# Patient Record
Sex: FEMALE | Race: WHITE | NOT HISPANIC OR LATINO
[De-identification: names, ages, dates, MRNs, and addresses within clinical notes are randomized per-mention and may not be internally consistent; named-entity substitution may affect disease eponyms.]

---

## 2021-10-05 ENCOUNTER — APPOINTMENT (OUTPATIENT)
Dept: UROLOGY | Facility: CLINIC | Age: 81
End: 2021-10-05
Payer: MEDICARE

## 2021-10-05 ENCOUNTER — NON-APPOINTMENT (OUTPATIENT)
Age: 81
End: 2021-10-05

## 2021-10-05 VITALS
BODY MASS INDEX: 20.57 KG/M2 | DIASTOLIC BLOOD PRESSURE: 84 MMHG | HEART RATE: 97 BPM | WEIGHT: 128 LBS | SYSTOLIC BLOOD PRESSURE: 145 MMHG | TEMPERATURE: 96.2 F | HEIGHT: 66 IN

## 2021-10-05 PROBLEM — Z00.00 ENCOUNTER FOR PREVENTIVE HEALTH EXAMINATION: Status: ACTIVE | Noted: 2021-10-05

## 2021-10-05 PROCEDURE — 99204 OFFICE O/P NEW MOD 45 MIN: CPT

## 2021-10-05 RX ORDER — AMOXICILLIN AND CLAVULANATE POTASSIUM 500; 125 MG/1; MG/1
500-125 TABLET, FILM COATED ORAL
Qty: 14 | Refills: 0 | Status: ACTIVE | COMMUNITY
Start: 2021-10-05 | End: 1900-01-01

## 2021-10-06 ENCOUNTER — TRANSCRIPTION ENCOUNTER (OUTPATIENT)
Age: 81
End: 2021-10-06

## 2021-10-07 LAB
C TRACH RRNA SPEC QL NAA+PROBE: NOT DETECTED
HSV 1+2 IGG SER IA-IMP: NEGATIVE
HSV 1+2 IGG SER IA-IMP: POSITIVE
HSV1 IGG SER QL: 0.32 INDEX
HSV2 IGG SER QL: 6.64 INDEX
N GONORRHOEA RRNA SPEC QL NAA+PROBE: NOT DETECTED
SOURCE AMPLIFICATION: NORMAL

## 2021-10-08 LAB — RPR SER-TITR: ABNORMAL

## 2021-10-11 LAB
HSV1 IGM SER QL: NEGATIVE
HSV2 AB FLD-ACNC: POSITIVE

## 2021-10-11 NOTE — ASSESSMENT
[FreeTextEntry1] : \par =======================================================================================\par ASSESSMENT and PLAN\par \par The patient is a 80 year female with a history of the following:\par \par 1. Genital sores:\par She has genital sores that are painful. They may represent folliculitis or an STD. they do not appear to be an allergy.\par PLan:\par RPR, HSV Abs, GC/CT\par Augmentin\par \par The patient understands that Augmentin may cause nausea, vomiting, diarrhea, rash, headache and dizziness. She should stop taking it and contact me if she develops a severe rash, difficulty breathing or diarrhea.\par \par As with most antibiotics, they can interfere with the strength of some blood thinners (like Coumadin) and diabetes meds. The patient understands that this can cause blood to be too thick or thin and/or blood sugars to go too high or too low. The patient understands that if she  is on blood thinners she  needs to monitor for signs of bleeding. The patient understands that if she  is diabetic she  will have to more closely monitor her  blood sugars.\par \par \par 2. Urethral prolapse:\par she is already on Premarin and I asked her to apply daily to her urethra for two weeks. I will hold off on her SITZ baths due to her genital sores.\par \par -----------------------------------------------------------------------------------------------------\par LABS/TESTS Ordered: HSV Abs, RPR, GC/CT\par Meds Ordered: Augmentin one pill BID x7d, Premarin cream to urethra every day for two weeks, then twice weekly\par Follow up: 3 weeks\par -----------------------------------------------------------------------------------------------------\par \par Greater than 50% of this 45 minute visit was spent counseling the patient and coordinating care.\par \par Thank you for allowing me to assist in the care of your patient. Should you have any questions please do not hesitate to reach out to me.\par \par \par Mary Haywood MD\par Associate \par Department of Urology\par [facility]\par Phone: [phone]\par Fax: [phone]\par \par [address]\[facility] [address]\par

## 2021-10-11 NOTE — HISTORY OF PRESENT ILLNESS
[FreeTextEntry1] : Language: English\par Date of First visit: 10/5/2021\par Accompanied by: Self\par Contact info: [phone]\par Referring Provider/PCP: Dr. Kennedy Kelly\par ([facility])\par Fax: [phone]\par \par Gyn: Sara Porter\par Fax [phone]\par \par \par \par CC/ Problem List:\par \par ===============================================================================\par FIRST VISIT:\par The patient is a 80 year female who first presents 10/05/2021 for genital pain. she thought she had an infection but her doctor told her she had a polyp on her urethra. She had a urine test and there was no infection.\par All of her labia and painful and swollen. She states it is very sore but she is not sure if she has sores. Her Gynecologist told her to get hydrocortisone but sent her here for  her urethra. The cream did not help her. She feels the flesh in her genitals hurts when she is peeing and walking and most times.\par \par She denies changes to her soaps, detergents, diet, activity. She is sexually active with her boyfriend. \par she has no h/o gynecologic cancers. She had a shingles vaccine. She thinks this started after having intercourse (a few days later). She has never had cold sores.  She does not use protection during intercourse.\par \par -------------------------------------------------------------------------------------------\par INTERVAL VISITS:\par \par \par ===============================================================================\par \par PMH: Stroke \par PSH: Denies\par POBH: (if applicable) , LMP age 54\par FH: Denies\par \par ALL: NKDA\par MEDS: Aggrenox, Pravastatin, Amlodipine, Lunesta, Premarin cream\par SOC: Denies Tob, Social EtOH, No drugs\par \par \par ROS: Review of Systems is as per HPI unless otherwise denoted below\par \par \par ===============================================================================\par DATA: \par \par LABS:-------------------------------------------------------------------------------------------------------------------\par \par \par \par RADS:-------------------------------------------------------------------------------------------------------------------\par \par \par \par PATHOLOGY/CYTOLOGY:-------------------------------------------------------------------------------------------\par \par \par \par VOIDING STUDIES: ----------------------------------------------------------------------------------------------------\par \par \par \par STONE STUDIES: (Analysis/LLSA)----------------------------------------------------------------------------------\par \par \par \par PROCEDURES: -----------------------------------------------------------------------------------------------\par \par \par \par \par ===============================================================================\par \par PHYSICAL EXAM:\par \par GEN: AAOx3, NAD, Habitus: normal\par \par BARRIERS to CARE: none\par \par PSYCH: Appropriate Behavior, Affect Congruent\par \par HEENT: AT/NC Trachea midline. EOMI.\par \par Lungs: No labored breathing.\par \par NEURO: + Movement, all 4 extremities grossly intact without deficits. No tremors.\par \par SKIN: Warm dry. No visible rashes or ulcers\par \par GAIT: Gait mildly antalgic, Stability fair\par \par \par  FOCUSED: -----------------------------------------------------------------------------------------------\par \par Bladder: Nondistended, Nontender, No masses\par \par Ext Genitalia: Atrophic with two small flat red areas on her upper ext labia around hair follicle ? folliculitis or ingrown hair ? no purulence, fluctuance, crepitus, exudate; no vesicles\par \par Urethra: Normal, no masses, no scarring, + mucosal prolapse; No hypermobility\par \par No inguinal LAD or tenderness. No inguinal masses or ulcers. \par \par =======================================================================================\par

## 2021-10-11 NOTE — ADDENDUM
[FreeTextEntry1] : 10/5/2021: GC/CT negative, HSV 2 IgG positive, HSV 1 IgG negative, HSV2 IgM positive, HSV 1 IgM negative\par 10/5/2021: RPR 1:1 which is borderline. \par \par CAlled pt and she states she has a "fever" of 99 and HR of 92bpm. recommended that she speak to her PCP but she states her PCP won't call her back.\par I explained that her HSV 2 abx are positive but this does not tell me where in her body it is: it only tells me she was exposed to it. I recommended a culture (we have ordered swabs and they will arrive tomorrow or Monday), with the caveat that viral shedding is more robust in the start of an infection and then will often taper off so she may test negative.\par She seemed a bit confused by this information (as many are). She at first refused to come in and see me but then agreed. She will not come in until Tuesday because she is busy and can't get to the city.\par \par 10/8/2021 Asked Laly if she can find out how to order FTA-ABS since it is not in the orders

## 2021-10-12 ENCOUNTER — APPOINTMENT (OUTPATIENT)
Dept: UROLOGY | Facility: CLINIC | Age: 81
End: 2021-10-12
Payer: MEDICARE

## 2021-10-12 VITALS
DIASTOLIC BLOOD PRESSURE: 77 MMHG | SYSTOLIC BLOOD PRESSURE: 157 MMHG | TEMPERATURE: 97.2 F | HEART RATE: 76 BPM | OXYGEN SATURATION: 97 %

## 2021-10-12 PROCEDURE — 99214 OFFICE O/P EST MOD 30 MIN: CPT

## 2021-10-13 LAB — T PALLIDUM AB SER QL IA: NEGATIVE

## 2021-10-18 NOTE — HISTORY OF PRESENT ILLNESS
[FreeTextEntry1] : Language: English\par Date of First visit: 10/5/2021\par Accompanied by: Self\par Contact info: [phone]\par Referring Provider/PCP: Dr. Kennedy Kelly\par ([facility])\par Fax: [phone]\par \par Gyn: Sara Porter\par Fax [phone]\par \par \par \par CC/ Problem List:\par Urethral mucosal prolapse\par HSV-2, acute first infection\par \par ===============================================================================\par FIRST VISIT:\par The patient is a 80 year female who first presented on 10/05/2021 for genital pain. She thought she had an infection but her doctor told her she had a polyp on her urethra. She had a urine test and there was no infection.\par She reported that her labia were painful and swollen. She states it is very sore but she is not sure if she has sores. Her Gynecologist told her to get hydrocortisone but sent her here for  her urethra. The cream did not help her. She feels the flesh in her genitals hurts when she is peeing and walking and most times. She has a sensation of blockage when she voids and had some constipation.\par \par She denies changes to her soaps, detergents, diet, activity. She is sexually active with her boyfriend and she does not use protection.\par she has no h/o gynecologic cancers. She had a shingles vaccine. She thinks this started after having intercourse (a few days later). She has never had cold sores.\par \par -------------------------------------------------------------------------------------------\par INTERVAL VISITS:\par At her first visit on 10/5/2021 she had genital ulcers on her labia. We did not have viral swabs for culture. She felt feverish later but did not have inguinal LAD. Her HSV abs returned positive for HSV -2 IgG and IgM indicating and acute first infection.\par \par Today 10/12/2021 she states the burning has subsided quite a bit. She still feels mild urinary "blockage". She is using the premarin on her urethral mucosal prolapse.\par \par ===============================================================================\par \par PMH: Stroke \par PSH: Denies\par POBH: (if applicable) , LMP age 54\par FH: Denies\par \par ALL: NKDA\par MEDS: Aggrenox, Pravastatin, Amlodipine, Lunesta, Premarin cream\par SOC: Denies Tob, Social EtOH, No drugs\par \par \par ROS: Review of Systems is as per HPI unless otherwise denoted below\par \par \par ===============================================================================\par DATA: \par \par LABS:-------------------------------------------------------------------------------------------------------------------\par 10/5/2021: GC/CT negative, HSV 2 IgG positive, HSV 1 IgG negative, HSV2 IgM positive, HSV 1 IgM negative\par 10/5/2021: RPR 1:1 which is borderline. \par \par \par \par RADS:-------------------------------------------------------------------------------------------------------------------\par \par \par \par PATHOLOGY/CYTOLOGY:-------------------------------------------------------------------------------------------\par \par \par \par VOIDING STUDIES: ----------------------------------------------------------------------------------------------------\par \par \par \par STONE STUDIES: (Analysis/LLSA)----------------------------------------------------------------------------------\par \par \par \par PROCEDURES: -----------------------------------------------------------------------------------------------\par \par \par \par \par ===============================================================================\par \par PHYSICAL EXAM:\par \par GEN: AAOx3, NAD, Habitus: normal\par \par BARRIERS to CARE: none\par \par PSYCH: Appropriate Behavior, Affect Congruent\par \par HEENT: AT/NC Trachea midline. EOMI.\par \par Lungs: No labored breathing.\par \par NEURO: + Movement, all 4 extremities grossly intact without deficits. No tremors.\par \par SKIN: Warm dry. No visible rashes or ulcers\par \par GAIT: Gait mildly antalgic, Stability fair\par \par \par  FOCUSED: -----------------------------------------------------------------------------------------------\par \par Bladder: Nondistended, Nontender, No masses\par \par Ext Genitalia: lesions on labia are gone. \par \par Urethra: Normal, no masses, no scarring, + mucosal prolapse; No hypermobility\par \par No inguinal LAD or tenderness. No inguinal masses or ulcers. \par \par =======================================================================================\par

## 2021-10-18 NOTE — ASSESSMENT
[FreeTextEntry1] : \par =======================================================================================\par ASSESSMENT and PLAN\par \par The patient is a 80 year female with a history of the following:\par \par 1. Genital sores:\par She has genital sores that are painful.\par Her HSV-2 abs IgG and IgM came back positive and given her total clinical picture I suspect she had a primary genital infection of HSV-2. Her lesions are largely gone, but I sent cultures (now that we have culture swabs) which I suspect will be negative. \par \par I told the patient that there are two types of HSV (types 1, 2). I explained that HSV can be contracted on multiple parts of the body including the genitals, the lips, the finger (vinay), and the eye. Contrary to common believe, Type 1 and type 2 do not "belong" to any one part of the body and now at least 20% of cases of genital HSV are type 1 and vice versa. I explained that while the first outbreak of HSV is often the worst, this is not always true and up to 40% of people with HSV do not know that they have it. Additionally it is very hard to tell when a person contracts an infection based on symptoms or lesions. I explained that the only way to tell if someone was recently infected is to check an IgM antibody but even this has error.  \par \par PREVENTION: I explained that safe sex is certainly indicated during times of outbreaks and immediately prior (when patients often get a prodrome of tingling or itching). However I also explained the phenomena of asymptomatic shedding which renders a person "contagious" even when they do not have symptoms. As such honesty and protection are the best policy.  \par \par CROSS REACTIVITY: Although having an HSV infection is somewhat protective against a second infection with another strain, it is not entirely protective. Additionally I stressed the importance of good hand hygiene to prevent transmission to the fingers or eyes.  \par \par PROPHYLAXIS: I explained that we can Rx prophylaxis if recurrences become too bothersome or frequent. It can also reduce asymptomatic shedding. Suppression dosing is Valtrex 1000mg po QD, or 500mg po QD if <10 outbreaks/year); Acyclovir 400mg po BID \par \par DIAGNOSIS: I also explained that without culture of the lesion, blood serology can not tell me the location of the lesion. Recent infections also may not have seroconverted yet.  \par \par Based on our conversation the patient decided to forgo blood tests and to discuss his options with his partner more. He seems to have good insight and I encouraged him to bring his partner in for further discussion. He will email me if he gets another lesion and wants it cultured. \par \par TREATMENT:  \par \par Treatment of an initial infection is Valtrex 1000mg po Q12H x7-10d within 48-72 hours of onset; Acyclovir 400mg po TID x7-10d \par \par Treatment of recurrence is Valtrex 500mg po Q12H x3 days OR 1000mg po QD x5 days start within 24 hours; Acyclovir 400mg po TID x5 days or 800mg po BID x5 days\par \par She wants to bring her boyfriend in to have a discussion about HSV. We will treat or prophylax her based on recurrences.\par \par 2. Urethral prolapse:\par she is already on Premarin and I asked her to apply daily to her urethra for two weeks then BIW\par \par 3. Borderline RPR\par I tried to order her an FTA-ABS\par \par -----------------------------------------------------------------------------------------------------\par LABS/TESTS  FTA-ABS\par Meds Ordered: Premarin continue\par Follow up: with boyfriend for a 30 min visit\par -----------------------------------------------------------------------------------------------------\par \par Greater than 50% of this 30 minute visit was spent counseling the patient and coordinating care.\par \par Thank you for allowing me to assist in the care of your patient. Should you have any questions please do not hesitate to reach out to me.\par \par \par Mary Haywood MD\par Associate \[facility] Department of Urology\[facility] Phone: [phone]\[facility] Fax: [phone]\[facility] \[facility] [address]\[facility] [address]\[facility]

## 2021-10-18 NOTE — ADDENDUM
[FreeTextEntry1] : 10/12/2021: Treponema Ab negative\par 10/12/2021: Viral culture for herpes taken from labia + for HSV

## 2021-10-19 ENCOUNTER — APPOINTMENT (OUTPATIENT)
Dept: UROLOGY | Facility: CLINIC | Age: 81
End: 2021-10-19
Payer: MEDICARE

## 2021-10-19 PROCEDURE — 99214 OFFICE O/P EST MOD 30 MIN: CPT

## 2021-11-15 LAB
HHV SPEC CULT: ABNORMAL
HSV TYPE 1: NORMAL
HSV TYPE 2: ABNORMAL

## 2021-11-19 NOTE — ADDENDUM
[FreeTextEntry1] : Called pt back today 11/19/2021 because she likely has another outbreak.\par She will take meds but was worried about thrombosis. \par I told her she can take it.

## 2021-11-19 NOTE — ASSESSMENT
[FreeTextEntry1] : \par =======================================================================================\par ASSESSMENT and PLAN\par \par Today 10/19/2021 the patient is a 80 year old female with a history of the following:\par \par 1. Genital HSV-2\par She has genital sores that are painful.\par Her HSV-2 abs IgG and IgM came back positive and her genital culture also returned HSV-2 positive. \par \par She returns with her boyfriend to discuss their clinical situation. HE has never had symptoms of HSV but given that they are monogamous and this was her first infection, he acknowledges that she likely got it from him.\par \par I told the patient that there are two types of HSV (types 1, 2). I explained that HSV can be contracted on multiple parts of the body including the genitals, the lips, the finger (vinay), and the eye. Contrary to common belief, Type 1 and type 2 do not "belong" to any one part of the body and now at least 20% of cases of genital HSV are type 1 and vice versa. I explained that while the first outbreak of HSV is often the worst, this is not always true and up to 40% of people with HSV do not know that they have it. Additionally it is very hard to tell when a person contracts an infection based on symptoms or lesions. I explained that the only way to tell if someone was recently infected is to check an IgM antibody but even this has error.  \par \par PREVENTION: I explained that safe sex is certainly indicated during times of outbreaks and immediately prior (when patients often get a prodrome of tingling or itching). However I also explained the phenomena of asymptomatic shedding which renders a person "contagious" even when they do not have symptoms. As such honesty and protection are the best policy.  \par \par CROSS REACTIVITY: Although having an HSV infection is somewhat protective against a second infection with another strain, it is not entirely protective. Additionally I stressed the importance of good hand hygiene to prevent transmission to the fingers or eyes.  \par \par PROPHYLAXIS: I explained that we can Rx prophylaxis if recurrences become too bothersome or frequent. It can also reduce asymptomatic shedding. Suppression dosing is Valtrex 1000mg po QD, or 500mg po QD if <10 outbreaks/year); Acyclovir 400mg po BID \par \par DIAGNOSIS: I also explained that without culture of the lesion, blood serology can not tell me the location of the lesion. Recent infections also may not have seroconverted yet.  \par \par Based on our conversation the patient decided to forgo blood tests and to discuss his options with his partner more. He seems to have good insight and I encouraged him to bring his partner in for further discussion. He will email me if he gets another lesion and wants it cultured. \par \par TREATMENT:  \par \par Treatment of an initial infection is Valtrex 1000mg po Q12H x7-10d within 48-72 hours of onset; Acyclovir 400mg po TID x7-10d \par \par Treatment of recurrence is Valtrex 500mg po Q12H x3 days OR 1000mg po QD x5 days start within 24 hours; Acyclovir 400mg po TID x5 days or 800mg po BID x5 days\par \par \par I had explained all of this to the patient and she had me re-explain all of these things to her partner. They were given an opportunity to ask questions and they were answered. The patient expressed interest in PRN suppression. I therefore gave her Valtrex 500mg po BID x3d to start as soon as she has symptoms. She will keep track of the number of outbreaks she has because if she has a significant number, we will put her on daily suppression.\par \par \par 2. Urethral prolapse:\par she is already on Premarin and I asked her to apply daily to her urethra for two weeks then BIW\par \par 3. Borderline RPR\par Her FTA-ABS was negative\par \par -----------------------------------------------------------------------------------------------------\par LABS/TESTS  \[facility] Meds Ordered: Valtrex 500mg po BID x3d\par Follow up: one year\par -----------------------------------------------------------------------------------------------------\par \par Greater than 50% of this 35 minute visit was spent counseling the patient and coordinating care.\par \par Thank you for allowing me to assist in the care of your patient. Should you have any questions please do not hesitate to reach out to me.\par \par \par Mary Haywood MD\par Associate \[facility] Department of Urology\[facility]\par Phone: [phone]\[facility] Fax: [phone]\[facility] \[address] [address]\[facility]

## 2021-12-08 RX ORDER — VALACYCLOVIR 500 MG/1
500 TABLET, FILM COATED ORAL
Qty: 12 | Refills: 11 | Status: ACTIVE | COMMUNITY
Start: 2021-10-19

## 2021-12-16 ENCOUNTER — APPOINTMENT (OUTPATIENT)
Dept: UROLOGY | Facility: CLINIC | Age: 81
End: 2021-12-16
Payer: MEDICARE

## 2021-12-16 VITALS
SYSTOLIC BLOOD PRESSURE: 132 MMHG | DIASTOLIC BLOOD PRESSURE: 78 MMHG | OXYGEN SATURATION: 96 % | HEART RATE: 83 BPM | TEMPERATURE: 96.7 F

## 2021-12-16 DIAGNOSIS — A60.00 HERPESVIRAL INFECTION OF UROGENITAL SYSTEM, UNSPECIFIED: ICD-10-CM

## 2021-12-16 PROCEDURE — 99213 OFFICE O/P EST LOW 20 MIN: CPT

## 2021-12-16 NOTE — ASSESSMENT
[FreeTextEntry1] : \par =======================================================================================\par ASSESSMENT and PLAN\par \par Today 10/19/2021 the patient is a 80 year old female with a history of the following:\par \par 1. Genital HSV-2\par She has genital sores that are painful.\par Her HSV-2 abs IgG and IgM came back positive and her genital culture also returned HSV-2 positive. \par \par She has gotten several outbreaks and she is 82yo so this is not surprising. I have recommended starting her on Valtrex 500mg po QD to see how she does (suppression dose). since her partner has HSV, she can have intercourse but the suppression will help prevent shedding.\par \par 2. Urethral prolapse:\par she is already on Premarin and I asked her to apply daily to her urethra for two weeks then BIW\par She feels that this helps. We will continue this.\par \par 3. Borderline RPR\par Her FTA-ABS was negative\par \par -----------------------------------------------------------------------------------------------------\par LABS/TESTS  \par Meds Ordered: Valtrex 500mg po QD\par Follow up: 4 mos\par -----------------------------------------------------------------------------------------------------\par \par Greater than 50% of this 15 minute visit was spent counseling the patient and coordinating care.\par \par Thank you for allowing me to assist in the care of your patient. Should you have any questions please do not hesitate to reach out to me.\par \par \par Mary Haywood MD\par Associate \[facility] Department of Urology\par [facility]\par Phone: [phone]\par Fax: [phone]\par \par [address]\[facility] [address]\[facility]

## 2021-12-16 NOTE — HISTORY OF PRESENT ILLNESS
[FreeTextEntry1] : Language: English\par Date of First visit: 10/5/2021\par Accompanied by: Self\par Contact info: [phone]\par Referring Provider/PCP: Dr. Kennedy Kelly\par ([facility])\par Fax: [phone]\par \par Gyn: Sara Porter\par Fax [phone]\par \par \par \par CC/ Problem List:\par Urethral mucosal prolapse\par HSV-2, acute first infection\par \par ===============================================================================\par FIRST VISIT:\par The patient was a 80 year female who first presented on 10/05/2021 for genital pain. She thought she had an infection but her doctor told her she had a polyp on her urethra. She had a urine test and there was no infection.\par She reported that her labia were painful and swollen. She states it is very sore but she is not sure if she has sores. Her Gynecologist told her to get hydrocortisone but sent her here for  her urethra. The cream did not help her. She feels the flesh in her genitals hurts when she is peeing and walking and most times. She has a sensation of blockage when she voids and had some constipation.\par \par She denies changes to her soaps, detergents, diet, activity. She is sexually active with her boyfriend and she does not use protection.\par she has no h/o gynecologic cancers. She had a shingles vaccine. She thinks this started after having intercourse (a few days later). She has never had cold sores.\par \par -------------------------------------------------------------------------------------------\par INTERVAL VISITS:\par \par At her first visit on 10/5/2021 she had genital ulcers on her labia. We did not have viral swabs for culture. She felt feverish later but did not have inguinal LAD. Her HSV abs returned positive for HSV -2 IgG and IgM indicating an acute first infection.\par \par We got viral swabs and retested her genitalia on 10/12/2021 and they also returned HSV positive even though she no longer had ulcers.\par \par The patient's age today 2021 is 81 year old.\par Please note interval events and changes in PMH, PSH, MEDS and ALLERGIES were reviewed.\par \par She has had had 3-4 outbreaks since I saw her in 2021. She has not had sex because of it. She thinks she may be getting an outbreak now as she has started to notice a prodrome. She feels the premarin has helped her urethra.\par \par \par \par ===============================================================================\par \par PMH: Stroke \par PSH: Denies\par POBH: (if applicable) , LMP age 54\par FH: Denies\par \par ALL: NKDA\par MEDS: Aggrenox, Pravastatin, Amlodipine, Lunesta, Premarin cream, Valtrex PRN\par SOC: Denies Tob, Social EtOH, No drugs\par \par \par ROS: Review of Systems is as per HPI unless otherwise denoted below\par \par \par ===============================================================================\par DATA: \par \par LABS:-------------------------------------------------------------------------------------------------------------------\par 10/5/2021: GC/CT negative, HSV 2 IgG positive, HSV 1 IgG negative, HSV2 IgM positive, HSV 1 IgM negative\par 10/5/2021: RPR 1:1 which is borderline. \par 10/12/2021: Treponema Ab negative\par 10/12/2021: Viral culture for herpes taken from [facility] + for HSV\par \par \par \par RADS:-------------------------------------------------------------------------------------------------------------------\par \par \par \par PATHOLOGY/CYTOLOGY:-------------------------------------------------------------------------------------------\par \par \par \par VOIDING STUDIES: ----------------------------------------------------------------------------------------------------\par \par \par \par STONE STUDIES: (Analysis/LLSA)----------------------------------------------------------------------------------\par \par \par \par PROCEDURES: -----------------------------------------------------------------------------------------------\par \par \par \par \par ===============================================================================\par \par PHYSICAL EXAM:\par \par GEN: AAOx3, NAD, Habitus: normal\par \par BARRIERS to CARE: none\par \par PSYCH: Appropriate Behavior, Affect Congruent\par \par HEENT: AT/NC Trachea midline. EOMI.\par \par Lungs: No labored breathing.\par \par NEURO: + Movement, all 4 extremities grossly intact without deficits. No tremors.\par \par SKIN: Warm dry. No visible rashes or ulcers\par \par GAIT: Gait mildly antalgic, Stability fair\par \par =======================================================================================\par

## 2022-05-10 ENCOUNTER — APPOINTMENT (OUTPATIENT)
Dept: UROLOGY | Facility: CLINIC | Age: 82
End: 2022-05-10
Payer: MEDICARE

## 2022-05-10 PROCEDURE — 99213 OFFICE O/P EST LOW 20 MIN: CPT

## 2022-05-10 NOTE — HISTORY OF PRESENT ILLNESS
[FreeTextEntry1] : Language: English\par Date of First visit: 10/5/2021\par Accompanied by: Self\par Contact info: [phone]\par Referring Provider/PCP: Dr. Kennedy Kelly\par ([facility])\par Fax: [phone]\par \par Gyn: Sara Porter\par Fax [phone]\par \par \par \par CC/ Problem List:\par Urethral mucosal prolapse\par HSV-2, acute first infection\par \par ===============================================================================\par FIRST VISIT:\par The patient was a 80 year female who first presented on 10/05/2021 for genital pain. She thought she had an infection but her doctor told her she had a polyp on her urethra. She had a urine test and there was no infection.\par She reported that her labia were painful and swollen. She states it is very sore but she is not sure if she has sores. Her Gynecologist told her to get hydrocortisone but sent her here for  her urethra. The cream did not help her. She feels the flesh in her genitals hurts when she is peeing and walking and most times. She has a sensation of blockage when she voids and had some constipation.\par \par She denies changes to her soaps, detergents, diet, activity. She is sexually active with her boyfriend and she does not use protection.\par she has no h/o gynecologic cancers. She had a shingles vaccine. She thinks this started after having intercourse (a few days later). She has never had cold sores.\par \par -------------------------------------------------------------------------------------------\par INTERVAL VISITS:\par \par At her first visit on 10/5/2021 she had genital ulcers on her labia. We did not have viral swabs for culture. She felt feverish later but did not have inguinal LAD. Her HSV abs returned positive for HSV -2 IgG and IgM indicating an acute first infection.\par \par We got viral swabs and retested her genitalia on 10/12/2021 and they also returned HSV positive even though she no longer had ulcers.\par \par She has had had 3-4 outbreaks between October and Dec 2021. She has not had sex because of it. She thinks she may be getting an outbreak now as she has started to notice a prodrome. She feels the premarin has helped her urethra. \par In Dec 2021 we put her on Valtrex suppression.\par \par The patient's age today 05/10/2022 is 81 year old.\par Please note interval events and changes in PMH, PSH, MEDS and ALLERGIES were reviewed.\par \par She takes the HSV prophylaxis every day. \par Her BF now wants to get tested to prove he doesn't have it since he has no symptoms.\par She feels she desires sex less but she also went through something traumatic. She is still using premarin.\par she has a bit more discomfort during intercourse now. \par \par \par ===============================================================================\par \par PMH: Stroke \par PSH: Denies\par POBH: (if applicable) , LMP age 54\par FH: Denies\par \par ALL: NKDA\par MEDS: Aggrenox, Pravastatin, Amlodipine, Lunesta, Premarin cream, Valtrex QD\par SOC: Denies Tob, Social EtOH, No drugs\par \par \par ROS: Review of Systems is as per HPI unless otherwise denoted below\par \par \par ===============================================================================\par DATA: \par \par LABS:-------------------------------------------------------------------------------------------------------------------\par 10/5/2021: GC/CT negative, HSV 2 IgG positive, HSV 1 IgG negative, HSV2 IgM positive, HSV 1 IgM negative\par 10/5/2021: RPR 1:1 which is borderline. \par 10/12/2021: Treponema Ab negative\par 10/12/2021: Viral culture for herpes taken from [facility] + for HSV\par \par \par \par RADS:-------------------------------------------------------------------------------------------------------------------\par \par \par \par PATHOLOGY/CYTOLOGY:-------------------------------------------------------------------------------------------\par \par \par \par VOIDING STUDIES: ----------------------------------------------------------------------------------------------------\par \par \par \par STONE STUDIES: (Analysis/LLSA)----------------------------------------------------------------------------------\par \par \par \par PROCEDURES: -----------------------------------------------------------------------------------------------\par \par \par \par \par ===============================================================================\par \par PHYSICAL EXAM:\par \par GEN: AAOx3, NAD, Habitus: normal\par \par BARRIERS to CARE: none\par \par PSYCH: Appropriate Behavior, Affect Congruent\par \par HEENT: AT/NC Trachea midline. EOMI.\par \par Lungs: No labored breathing.\par \par NEURO: + Movement, all 4 extremities grossly intact without deficits. No tremors.\par \par SKIN: Warm dry. No visible rashes or ulcers\par \par GAIT: Gait mildly antalgic, Stability fair\par \par =======================================================================================\par \par \par ASSESSMENT and PLAN\par \par Today 05/10/2022 the patient is a 81 year old female with a history of the following:\par \par 1. Genital HSV-2\par She has genital sores that are painful.\par Her HSV-2 abs IgG and IgM came back positive and her genital culture also returned HSV-2 positive. \par \par She has gotten several outbreaks and she is 82yo so this is not surprising. We put her on Valtrex QD and she has been outbreak free. She has mild discomfort with intercourse but otherwise is doing well. She will continue the Valtrex and Premarin. \par \par \par 2. Urethral prolapse:\par she is already on Premarin and I asked her to apply daily to her urethra for two weeks then BIW\par She feels that this helps. We will continue this.\par \par \par 3. Borderline RPR\par Her FTA-ABS was negative\par \par -----------------------------------------------------------------------------------------------------\par LABS/TESTS  \par Meds Ordered: Valtrex 500mg po QD, cont Premarin\par Follow up: Video Visit 11 mos\par -----------------------------------------------------------------------------------------------------\par \par Greater than 50% of this 15 minute visit was spent counseling the patient and coordinating care.\par \[facility] Thank you for allowing me to assist in the care of your patient. Should you have any questions please do not hesitate to reach out to me.\par \[facility] \[facility] Mary Haywood MD\[facility] Associate \[facility] Department of Urology\[facility] Phone: [phone]\[facility] Fax: [phone]\[facility] \[facility] [address]\[facility] [address]\[facility]

## 2023-02-13 ENCOUNTER — APPOINTMENT (OUTPATIENT)
Dept: UROLOGY | Facility: CLINIC | Age: 83
End: 2023-02-13
Payer: MEDICARE

## 2023-02-13 VITALS
HEART RATE: 70 BPM | DIASTOLIC BLOOD PRESSURE: 76 MMHG | TEMPERATURE: 98.2 F | SYSTOLIC BLOOD PRESSURE: 137 MMHG | OXYGEN SATURATION: 97 %

## 2023-02-13 PROCEDURE — 99214 OFFICE O/P EST MOD 30 MIN: CPT

## 2023-02-13 NOTE — HISTORY OF PRESENT ILLNESS
[FreeTextEntry1] : Language: English\par Date of First visit: 10/5/2021\par Accompanied by: Self\par Contact info: [phone]\par Referring Provider/PCP: Dr. Kennedy Kelly\par ([facility])\par Fax: [phone]\par \par Gyn: Sara Porter\par Fax [phone]\par \par \par \par CC/ Problem List:\par Urethral mucosal prolapse\par HSV-2, acute first infection\par \par ===============================================================================\par FIRST VISIT:\par The patient was a 80 year female who first presented on 10/05/2021 for genital pain. She thought she had an infection but her doctor told her she had a polyp on her urethra. She had a urine test and there was no infection.\par She reported that her labia were painful and swollen. She states it is very sore but she is not sure if she has sores. Her Gynecologist told her to get hydrocortisone but sent her here for  her urethra. The cream did not help her. She feels the flesh in her genitals hurts when she is peeing and walking and most times. She has a sensation of blockage when she voids and had some constipation.\par \par She denies changes to her soaps, detergents, diet, activity. She is sexually active with her boyfriend and she does not use protection.\par she has no h/o gynecologic cancers. She had a shingles vaccine. She thinks this started after having intercourse (a few days later). She has never had cold sores.\par \par -------------------------------------------------------------------------------------------\par INTERVAL VISITS:\par \par At her first visit on 10/5/2021 she had genital ulcers on her labia. We did not have viral swabs for culture. She felt feverish later but did not have inguinal LAD. Her HSV abs returned positive for HSV -2 IgG and IgM indicating an acute first infection.\par \par We got viral swabs and retested her genitalia on 10/12/2021 and they also returned HSV positive even though she no longer had ulcers.\par \par She has had had 3-4 outbreaks between October and Dec 2021. She has not had sex because of it. She thinks she may be getting an outbreak now as she has started to notice a prodrome. She feels the premarin has helped her urethra. \par In Dec 2021 we put her on Valtrex suppression.\par \par The patient's age today 2023 is 82 year old.\par Please note interval events and changes in PMH, PSH, MEDS and ALLERGIES were reviewed.\par She found out on 2022 that her boyfriend was cheating on her since they met. She and her boyfriend broke up. She is happy and sees her female friends and goes out more. She is very happy with this though it was quite an adjustment. She is taking daily Valtrex. She forgot it once and 18 hours later she felt a prodrome. 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\par PSH: Denies\par POBH: (if applicable) , LMP age 54\par FH: Denies\par \par ALL: NKDA\par MEDS: Aggrenox, Pravastatin, Amlodipine, Lunesta, Premarin cream, Valtrex QD\par SOC: Denies Tob, Social EtOH, No drugs\par \par \par ROS: Review of Systems is as per HPI unless otherwise denoted below\par \par \par ===============================================================================\par DATA: \par \par LABS:-------------------------------------------------------------------------------------------------------------------\par 10/5/2021: GC/CT negative, HSV 2 IgG positive, HSV 1 IgG negative, HSV2 IgM positive, HSV 1 IgM negative\par 10/5/2021: RPR 1:1 which is borderline. \par 10/12/2021: Treponema Ab negative\par 10/12/2021: Viral culture for herpes taken from [facility] + for HSV\par \par \par \par RADS:-------------------------------------------------------------------------------------------------------------------\par \par \par \par PATHOLOGY/CYTOLOGY:-------------------------------------------------------------------------------------------\par \par \par \par VOIDING STUDIES: ----------------------------------------------------------------------------------------------------\par \par \par \par STONE STUDIES: (Analysis/LLSA)----------------------------------------------------------------------------------\par \par \par \par PROCEDURES: -----------------------------------------------------------------------------------------------\par \par \par \par \par ===============================================================================\par \par PHYSICAL EXAM:\par \par GEN: AAOx3, NAD, Habitus: normal\par \par BARRIERS to CARE: none\par \par PSYCH: Appropriate Behavior, Affect Congruent\par \par HEENT: AT/NC Trachea midline. EOMI.\par \par Lungs: No labored breathing.\par \par NEURO: + Movement, all 4 extremities grossly intact without deficits. No tremors.\par \par SKIN: Warm dry. No visible rashes or ulcers\par \par GAIT: Gait mildly antalgic, Stability fair\par \par =======================================================================================\par \par \par ASSESSMENT and PLAN\par \par Today 2023 the patient is a 82 year old female with a history of the following:\par \par \par 1. Genital HSV-2\par She has genital sores that are painful.\par Her HSV-2 abs IgG and IgM came back positive and her genital culture also returned HSV-2 positive. \par \par She has gotten several outbreaks and she is 82yo so this is not surprising. We put her on Valtrex QD and she has been outbreak free. She will continue the Valtrex and Premarin. \par \par \par 2. Urethral prolapse:\par she is already on Premarin and I asked her to apply daily to her urethra for two weeks then BIW\par She feels that this helps. We will continue this.\par \par \par 3. Borderline RPR\par Her FTA-ABS was negative\par \par 4. Exercise\par Her youngest son does weight lifting. She may consider this and will speak to her doctor about clearance and may seek  from her son.\par \par -----------------------------------------------------------------------------------------------------\par LABS/TESTS  \par Meds Ordered: Valtrex 500mg po QD, cont Premarin\par Follow up: one year\par -----------------------------------------------------------------------------------------------------\par \par The total amount of time I have personally spent preparing for this visit, reviewing the patient's test results, obtaining external history, ordering tests/medications, documenting clinical information, communicating with and counseling the patient/family and/or caregiver(s), and spent face to face with the patient explaining the above was  35 minutes.\par \par Thank you for allowing me to assist in the care of your patient. Should you have any questions please do not hesitate to reach out to me.\par \par \par Mary Haywood MD\par Associate \[facility] Department of Urology\[facility] Phone: [phone]\[facility] Fax: [phone]\par \par [address] [place]\[facility] [address]\[facility]

## 2023-06-22 ENCOUNTER — RX RENEWAL (OUTPATIENT)
Age: 83
End: 2023-06-22

## 2024-02-05 ENCOUNTER — APPOINTMENT (OUTPATIENT)
Dept: UROLOGY | Facility: CLINIC | Age: 84
End: 2024-02-05
Payer: MEDICARE

## 2024-02-05 VITALS
TEMPERATURE: 94.64 F | OXYGEN SATURATION: 98 % | HEART RATE: 70 BPM | DIASTOLIC BLOOD PRESSURE: 73 MMHG | HEIGHT: 66 IN | BODY MASS INDEX: 20.09 KG/M2 | SYSTOLIC BLOOD PRESSURE: 132 MMHG | WEIGHT: 125 LBS

## 2024-02-05 DIAGNOSIS — N94.9 UNSPECIFIED CONDITION ASSOCIATED WITH FEMALE GENITAL ORGANS AND MENSTRUAL CYCLE: ICD-10-CM

## 2024-02-05 PROCEDURE — 99213 OFFICE O/P EST LOW 20 MIN: CPT

## 2024-02-05 RX ORDER — CONJUGATED ESTROGENS 0.62 MG/G
0.62 CREAM VAGINAL
Qty: 1 | Refills: 4 | Status: ACTIVE | COMMUNITY
Start: 2024-02-05 | End: 1900-01-01

## 2024-02-05 RX ORDER — VALACYCLOVIR 500 MG/1
500 TABLET, FILM COATED ORAL DAILY
Qty: 90 | Refills: 3 | Status: ACTIVE | COMMUNITY
Start: 2021-12-16 | End: 1900-01-01

## 2024-02-05 NOTE — HISTORY OF PRESENT ILLNESS
[FreeTextEntry1] : Language: English Date of First visit: 10/5/2021 Accompanied by: Self Contact info: [phone] Referring Provider/PCP: Dr. Kennedy Kelly ([facility]) Fax: [phone]  Gyn: Sara Porter Fax [phone]    CC/ Problem List: Urethral mucosal prolapse HSV-2, acute first infection  =============================================================================== FIRST VISIT: The patient was a 80 year female who first presented on 10/05/2021 for genital pain. She thought she had an infection but her doctor told her she had a polyp on her urethra. She had a urine test and there was no infection. She reported that her labia were painful and swollen. She states it is very sore but she is not sure if she has sores. Her Gynecologist told her to get hydrocortisone but sent her here for  her urethra. The cream did not help her. She feels the flesh in her genitals hurts when she is peeing and walking and most times. She has a sensation of blockage when she voids and had some constipation.  She denies changes to her soaps, detergents, diet, activity. She is sexually active with her boyfriend and she does not use protection. she has no h/o gynecologic cancers. She had a shingles vaccine. She thinks this started after having intercourse (a few days later). She has never had cold sores.  ------------------------------------------------------------------------------------------- INTERVAL VISITS:  At her first visit on 10/5/2021 she had genital ulcers on her labia. We did not have viral swabs for culture. She felt feverish later but did not have inguinal LAD. Her HSV abs returned positive for HSV -2 IgG and IgM indicating an acute first infection.  We got viral swabs and retested her genitalia on 10/12/2021 and they also returned HSV positive even though she no longer had ulcers.  She has had had 3-4 outbreaks between October and Dec 2021. She has not had sex because of it. She thinks she may be getting an outbreak now as she has started to notice a prodrome. She feels the premarin has helped her urethra.  In Dec 2021 we put her on Valtrex suppression.  She found out on 2022 that her boyfriend was cheating on her since they met. She and her boyfriend broke up. She is happy and sees her female friends and goes out more. She is very happy with this though it was quite an adjustment. She is taking daily Valtrex. She forgot it once and 18 hours later she felt a prodrome. She is also still uses Premarin and uses it BIW. She is wondering if her premarin will help her memory  The patient's age today 2024 is 83 year old. Please note interval events and changes in PMH, PSH, MEDS and ALLERGIES were reviewed. She had a suitor briefly. She has not had any sores or pain or prodrome symptoms. She is taking the Valtrex every day. She takes Lexapro for her panic attacks. She has a bit more nocturia than before.  She is still on premarin cream but not as much as she should. She is taking fitness classes where she "jumps" like "mad".   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 PSH: Denies POBH: (if applicable) , LMP age 54 FH: Denies  ALL: NKDA MEDS: Aggrenox, Pravastatin, Amlodipine, Lunesta, Premarin cream, Valtrex QD, Lexapro SOC: Denies Tob, Social EtOH, No drugs   ROS: Review of Systems is as per HPI unless otherwise denoted below   =============================================================================== DATA:   LABS:------------------------------------------------------------------------------------------------------------------- 10/5/2021: GC/CT negative, HSV 2 IgG positive, HSV 1 IgG negative, HSV2 IgM positive, HSV 1 IgM negative 10/5/2021: RPR 1:1 which is borderline.  10/12/2021: Treponema Ab negative 10/12/2021: Viral culture for herpes taken from labia + for HSV    RADS:-------------------------------------------------------------------------------------------------------------------    PATHOLOGY/CYTOLOGY:-------------------------------------------------------------------------------------------    VOIDING STUDIES: ----------------------------------------------------------------------------------------------------    STONE STUDIES: (Analysis/LLSA)----------------------------------------------------------------------------------    PROCEDURES: -----------------------------------------------------------------------------------------------     ===============================================================================  PHYSICAL EXAM:  GEN: AAOx3, NAD, Habitus: normal  BARRIERS to CARE: none  PSYCH: Appropriate Behavior, Affect Congruent  HEENT: AT/NC Trachea midline. EOMI.  Lungs: No labored breathing.  NEURO: + Movement, all 4 extremities grossly intact without deficits. No tremors.  SKIN: Warm dry. No visible rashes or ulcers  GAIT: Gait mildly antalgic, Stability fair  =======================================================================================   ASSESSMENT and PLAN   Today 2024 the patient is a 83 year old female with a history of the followin. Genital HSV-2 She has genital sores that are painful. Her HSV-2 abs IgG and IgM came back positive and her genital culture also returned HSV-2 positive.   She has gotten several outbreaks and she is 84yo so this is not surprising. We put her on Valtrex QD and she has been outbreak free. She will continue the Valtrex and Premarin.    2. Urethral prolapse: she is already on Premarin and I asked her to apply daily to her urethra for two weeks then BIW She feels that this helps. We will continue this.   3. Borderline RPR Her FTA-ABS was negative  4. Exercise I encouraged her to continue exercising.  ----------------------------------------------------------------------------------------------------- LABS/TESTS   Meds Ordered: Valtrex 500mg po QD (renewed), cont Premarin Follow up: one year -----------------------------------------------------------------------------------------------------  The total amount of time I have personally spent preparing for this visit, reviewing the patient's test results, obtaining external history, ordering tests/medications, documenting clinical information, communicating with and counseling the patient/family and/or caregiver(s), and spent face to face with the patient explaining the above was 35 minutes.  Thank you for allowing me to assist in the care of your patient. Should you have any questions please do not hesitate to reach out to me.   Mary Haywood MD Associate  Department of Urology [facility] Phone: [phone] Fax: [phone] [address] [address]

## 2025-02-27 ENCOUNTER — APPOINTMENT (OUTPATIENT)
Dept: UROLOGY | Facility: CLINIC | Age: 85
End: 2025-02-27
Payer: MEDICARE

## 2025-02-27 DIAGNOSIS — N94.9 UNSPECIFIED CONDITION ASSOCIATED WITH FEMALE GENITAL ORGANS AND MENSTRUAL CYCLE: ICD-10-CM

## 2025-02-27 PROCEDURE — 99213 OFFICE O/P EST LOW 20 MIN: CPT | Mod: 2W

## 2025-02-27 RX ORDER — ESTRADIOL 0.1 MG/G
0.1 CREAM VAGINAL
Qty: 1 | Refills: 1 | Status: ACTIVE | COMMUNITY
Start: 2025-02-27 | End: 1900-01-01

## 2025-03-03 ENCOUNTER — NON-APPOINTMENT (OUTPATIENT)
Age: 85
End: 2025-03-03